# Patient Record
Sex: MALE | Race: ASIAN | NOT HISPANIC OR LATINO | Employment: FULL TIME | URBAN - METROPOLITAN AREA
[De-identification: names, ages, dates, MRNs, and addresses within clinical notes are randomized per-mention and may not be internally consistent; named-entity substitution may affect disease eponyms.]

---

## 2017-10-02 ENCOUNTER — AMBULATORY - HEALTHEAST (OUTPATIENT)
Dept: NURSING | Facility: CLINIC | Age: 37
End: 2017-10-02

## 2017-10-02 DIAGNOSIS — Z23 NEED FOR IMMUNIZATION AGAINST INFLUENZA: ICD-10-CM

## 2017-10-02 DIAGNOSIS — Z00.00 PREVENTATIVE HEALTH CARE: ICD-10-CM

## 2018-01-18 ENCOUNTER — OFFICE VISIT - HEALTHEAST (OUTPATIENT)
Dept: FAMILY MEDICINE | Facility: CLINIC | Age: 38
End: 2018-01-18

## 2018-01-18 DIAGNOSIS — R05.9 COUGH: ICD-10-CM

## 2018-01-18 DIAGNOSIS — R52 BODY ACHES: ICD-10-CM

## 2018-01-18 DIAGNOSIS — J10.1 INFLUENZA A: ICD-10-CM

## 2018-01-18 LAB
DEPRECATED S PYO AG THROAT QL EIA: NORMAL
FLUAV AG SPEC QL IA: ABNORMAL
FLUBV AG SPEC QL IA: ABNORMAL

## 2018-01-19 LAB — GROUP A STREP BY PCR: NORMAL

## 2018-08-28 ENCOUNTER — AMBULATORY - HEALTHEAST (OUTPATIENT)
Dept: NURSING | Facility: CLINIC | Age: 38
End: 2018-08-28

## 2018-08-28 DIAGNOSIS — Z23 NEED FOR INFLUENZA VACCINATION: ICD-10-CM

## 2019-09-10 ENCOUNTER — COMMUNICATION - HEALTHEAST (OUTPATIENT)
Dept: FAMILY MEDICINE | Facility: CLINIC | Age: 39
End: 2019-09-10

## 2019-09-11 ENCOUNTER — AMBULATORY - HEALTHEAST (OUTPATIENT)
Dept: NURSING | Facility: CLINIC | Age: 39
End: 2019-09-11

## 2021-05-25 ENCOUNTER — OFFICE VISIT - HEALTHEAST (OUTPATIENT)
Dept: FAMILY MEDICINE | Facility: CLINIC | Age: 41
End: 2021-05-25

## 2021-05-25 DIAGNOSIS — J06.9 VIRAL URI WITH COUGH: ICD-10-CM

## 2021-05-25 LAB
SARS-COV-2 PCR COMMENT: NORMAL
SARS-COV-2 RNA SPEC QL NAA+PROBE: NEGATIVE
SARS-COV-2 VIRUS SPECIMEN SOURCE: NORMAL

## 2021-05-26 ENCOUNTER — COMMUNICATION - HEALTHEAST (OUTPATIENT)
Dept: SCHEDULING | Facility: CLINIC | Age: 41
End: 2021-05-26

## 2021-05-31 VITALS — WEIGHT: 185 LBS

## 2021-06-01 NOTE — TELEPHONE ENCOUNTER
Can you please  move this family of two from CSS schedule to Flu Schedule?  I don't see that they will be receiving other immunizations.  Thank you!

## 2021-06-15 NOTE — PROGRESS NOTES
Chief Complaint   Patient presents with     Cough     nose running 2xdays          HPI    Patient is here for two days of a cough, mostly nonproductive, with runny nose, and body aches. No fever, sore throat, chest pain, shortness of breath. Exposed to both Influenza and strep in his wife and kids.     ROS: Pertinent ROS noted in HPI.   Allergies   Allergen Reactions     No Known Drug Allergies        Patient Active Problem List   Diagnosis     Medial meniscus tear (left)     Knee instability (left)       Family History   Problem Relation Age of Onset     Other Mother 30     Behcet's syndrome dxd 30s y/o     Other Mother      Iodine allergy (entire family avoids iodized salt)     Inguinal hernia Father      father h/o inguinal hernia surgery.     Diabetes Sister      sister DM2/obesity     Other       - 2014: No known family h/o heart disease, cancers, or unexplained deaths < 51 y/o. Mother has Bescets dxd 30s y/o.  Allergy to iodine.  Father: healthy, h/o inguinal hernia surgery. 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)     Social History Main Topics     Smoking status: Never Smoker     Smokeless tobacco: Never Used     Alcohol use Yes      Comment: rare, social     Drug use: No     Sexual activity: Yes     Partners: Female     Other Topics Concern     Not on file     Social History Narrative    Notes per PCP, Dr Jorge 2014:         HOME ENVIRONMENT:    - 2014: Lives with wife, almost 3 y/o daughter,  son; no pets, house in [facility] (sometimes has brothers/family living with  him)        MARITAL HISTORY:    - Culturally  to See Prem since 2006        EDUCATION:    - [employer] grad 2000    - [employer] BS Biology in 2005    - [employer] Masters Arts in Instruction 2007        NATIVE LANGUAGE:    - HMONG = 1st language in home (read/write)    - ENGLISH fluent        HOBBIES/LEISURE ACTIVITIES:    - 8/2014: soccer, pick-up games (used to play on [employer] teams in college)        PERSONAL HISTORY:    - POLITICAL REFUGEE. Hmong. Born in [facility], [place] (6 y/o); moved to [place], [place] 1990  (11 y/o). Lived in [place], [place] for a year. Parents still living in [place], [place] (family farm)       Objective:    Vitals:    01/18/18 0947   BP: 110/72   Pulse: 83   Resp: 14   Temp: 98  F (36.7  C)   SpO2: 98%       Gen:NAD  Oropharynx: normal throat, oral mucosa  Ears: normal TMs and canals  Nose: no discharge  Neck: NAD  CV: RRR, no M, R, G  Pulm: CTAB, normal effort    Recent Results (from the past 24 hour(s))   Rapid Strep A Screen-Throat   Result Value Ref Range    Rapid Strep A Antigen No Group A Strep detected, presumptive negative No Group A Strep detected, presumptive negative   Influenza A/B Rapid Test   Result Value Ref Range    Influenza  A, Rapid Antigen Influenza A antigen detected (!) No Influenza A antigen detected    Influenza B, Rapid Antigen No Influenza B antigen detected No Influenza B antigen detected         Influenza A  -     oseltamivir (TAMIFLU) 75 MG capsule; Take 1 capsule (75 mg total) by mouth 2 (two) times a day for 5 days.    Cough  -     Rapid Strep A Screen-Throat  -     Group A Strep, RNA Direct Detection, Throat    Body aches  -     Influenza A/B Rapid Test

## 2021-06-17 NOTE — PATIENT INSTRUCTIONS - HE
Patient was educated on the natural course of condition. COVID PCR is pending. Self isolation discussed. Conservative measures discussed including rest, increased fluids, humidifier, and over-the-counter cough suppressants. See your primary care provider if symptoms do not improve in 7 days. Seek emergency care if you develop shortness of breath or fever over 103.

## 2021-06-17 NOTE — PROGRESS NOTES
co  URGENT CARE VISIT:    SUBJECTIVE:   Jean Reed is a 40 y.o. male presenting with a chief complaint of cough and loss of smell.  Onset was 1 day(s) ago. He denies the following symptoms: fever, chills, nasal congestion and dyspnea. Course of illness is stable. Treatment measures tried include cough suppressants with no relief of symptoms.  Predisposing factors include none. Works as a . Was fully vaccinated in March.     PMH: History reviewed. No pertinent past medical history.  Allergies: No known drug allergies   Medications:   No current outpatient medications on file.     No current facility-administered medications for this visit.      Social History:   Social History     Tobacco Use     Smoking status: Never Smoker     Smokeless tobacco: Never Used   Substance Use Topics     Alcohol use: Yes     Comment: rare, social        ROS:  Review of systems negative except as stated above.    OBJECTIVE:  /90 (Patient Site: Right Arm, Patient Position: Sitting, Cuff Size: Adult Regular)   Pulse 77   Temp 98.3  F (36.8  C) (Oral)   Resp 16   Wt 187 lb 4 oz (84.9 kg)   SpO2 96%     GENERAL APPEARANCE: healthy, alert and no distress  EYES: conjunctiva clear  HENT: ear canals and TM's normal.  Mildly erythematous oropharynx. Uvula midline.   NECK: supple, nontender, no lymphadenopathy  RESP: lungs clear to auscultation - no rales, rhonchi or wheezes  CV: regular rate and rhythm, normal S1 S2, no murmur noted  SKIN: no suspicious lesions or rashes    ASSESSMENT:  1. Viral URI with cough  Symptomatic COVID-19 Virus (CORONAVIRUS) PCR       PLAN:  Patient Instructions   Patient was educated on the natural course of condition. COVID PCR is pending. Self isolation discussed. Conservative measures discussed including rest, increased fluids, humidifier, and over-the-counter cough suppressants. See your primary care provider if symptoms do not improve in 7 days. Seek emergency care if you develop  shortness of breath or fever over 103.     Patient verbalized understanding and is agreeable to plan. The patient was discharged ambulatory and in stable condition.    Rea Argueta ....................  5/25/2021   11:44 AM

## 2021-06-27 ENCOUNTER — HEALTH MAINTENANCE LETTER (OUTPATIENT)
Age: 41
End: 2021-06-27

## 2021-07-06 VITALS
RESPIRATION RATE: 16 BRPM | SYSTOLIC BLOOD PRESSURE: 128 MMHG | WEIGHT: 187.25 LBS | OXYGEN SATURATION: 96 % | HEART RATE: 77 BPM | DIASTOLIC BLOOD PRESSURE: 90 MMHG | TEMPERATURE: 98.3 F

## 2021-10-17 ENCOUNTER — HEALTH MAINTENANCE LETTER (OUTPATIENT)
Age: 41
End: 2021-10-17

## 2022-05-09 ENCOUNTER — LAB (OUTPATIENT)
Dept: FAMILY MEDICINE | Facility: CLINIC | Age: 42
End: 2022-05-09
Payer: COMMERCIAL

## 2022-05-09 DIAGNOSIS — Z20.822 SUSPECTED COVID-19 VIRUS INFECTION: ICD-10-CM

## 2022-05-09 LAB — SARS-COV-2 RNA RESP QL NAA+PROBE: POSITIVE

## 2022-05-09 PROCEDURE — U0005 INFEC AGEN DETEC AMPLI PROBE: HCPCS

## 2022-05-09 PROCEDURE — U0003 INFECTIOUS AGENT DETECTION BY NUCLEIC ACID (DNA OR RNA); SEVERE ACUTE RESPIRATORY SYNDROME CORONAVIRUS 2 (SARS-COV-2) (CORONAVIRUS DISEASE [COVID-19]), AMPLIFIED PROBE TECHNIQUE, MAKING USE OF HIGH THROUGHPUT TECHNOLOGIES AS DESCRIBED BY CMS-2020-01-R: HCPCS

## 2022-05-10 ENCOUNTER — VIRTUAL VISIT (OUTPATIENT)
Dept: FAMILY MEDICINE | Facility: CLINIC | Age: 42
End: 2022-05-10
Payer: COMMERCIAL

## 2022-05-10 ENCOUNTER — TELEPHONE (OUTPATIENT)
Dept: NURSING | Facility: CLINIC | Age: 42
End: 2022-05-10
Payer: COMMERCIAL

## 2022-05-10 DIAGNOSIS — U07.1 INFECTION DUE TO 2019 NOVEL CORONAVIRUS: Primary | ICD-10-CM

## 2022-05-10 PROCEDURE — 99213 OFFICE O/P EST LOW 20 MIN: CPT | Mod: CS | Performed by: NURSE PRACTITIONER

## 2022-05-10 ASSESSMENT — ENCOUNTER SYMPTOMS
BACK PAIN: 1
ARTHRALGIAS: 1
FATIGUE: 1
COUGH: 1
SORE THROAT: 1

## 2022-05-10 NOTE — TELEPHONE ENCOUNTER
Coronavirus (COVID-19) Notification    Caller Name (Patient, parent, daughter/son, grandparent, etc)  Pena    Reason for call  Notify of Positive Coronavirus (COVID-19) lab results, assess symptoms,  review [facility] recommendations    Lab Result    Lab test:  2019-nCoV rRt-PCR or SARS-CoV-2 PCR    Oropharyngeal AND/OR nasopharyngeal swabs is POSITIVE for 2019-nCoV RNA/SARS-COV-2 PCR (COVID-19 virus)      Gather patient reported symptoms   Assessment   Current Symptoms at time of phone call, reported by patient: (if no symptoms, document: No symptoms] Yes   Date of symptom(s) onset (if applicable) 5/7/22     If at time of call, Patients symptoms have worsened, the Patient should contact 911 or have someone drive them to Emergency Dept promptly:      If Patient calling 911, inform 911 personal that you have tested positive for the Coronavirus (COVID-19).  Place mask on and await 911 to arrive.    If Emergency Dept, If possible, please have another adult drive you to the Emergency Dept but you need to wear mask when in contact with other people.      Treatment Options:   Patient classified as COVID treatment eligible by Epic high risk algorithm: Yes  Is the patient symptomatic at the time of result notification? Yes. Was the onset of symptoms within the last 5 days? Yes.   There are now oral medications available for the treatment of COVID-19.  Taking one of these medications within the first five days of symptoms (when people may not yet feel severely ill) has been shown to make people feel better, prevent them from getting sicker, and preventing hospitalization and death.   Does the patient agree to have a visit with a provider to discuss treatment options? Yes. Is the patient seen at [facility]?  No: Warm transfer caller to [phone] to be scheduled with a virtual urgent provider.  During transfer, instruct  on appropriate time frame for visit     Review information with  Patient    Your result was positive. This means you have COVID-19 (coronavirus).    How can I protect others?    These guidelines are for isolating before returning to work, school or .    If you DO have symptoms    Stay home and away from others     For at least 5 days after your symptoms started, AND    You are fever free for 24 hours (with no medicine that reduces fever), AND    Your other symptoms are better    Wear a mask for 10 full days anytime you are around others    If you DON'T have symptoms    Stay home and away from others for at least 5 days after your positive test    Wear a mask for 10 full days anytime you are around others    There may be different guidelines for healthcare facilities.  Please check with the specific sites before arriving.    If you have been told by a doctor that you were severely ill with COVID-19 or are immunocompromised, you should isolate for at least 10 days.    You should not go back to work until you meet the guidelines above for ending your home isolation. You don't need to be retested for COVID-19 before going back to work--studies show that you won't spread the virus if it's been at least 10 days since your symptoms started (or 20 days, if you have a weak immune system).    Employers, schools, and daycares: This is an official notice for this person's medical guidelines for returning in-person.  They must meet the above guidelines before going back to work, school or  in person.    You will receive a positive COVID-19 letter via [facility] or the mail soon with additional self-care information (exception, no letters will be sent to presurgical/preprocedure patients).    Would you like me to review some of that information with you now?  No    If you were tested for an upcoming procedure, please contact your provider for next steps.    Holli Cortes

## 2022-05-10 NOTE — PATIENT INSTRUCTIONS
"  Discharge Instructions for COVID-19 Patients  You have--or may have--COVID-19. Please follow the instructions listed below.   If you have a weakened immune system, discuss with your doctor any other actions you need to take.  How can I protect others?  If you have symptoms (fever, cough, body aches or trouble breathing):    Stay home and away from others (self-isolate) until:  ? Your other symptoms have resolved (gotten better). And   ? You've had no fever--and no medicine that reduces fever--for 1 full day (24 hours). And   ? At least 10 days have passed since your symptoms started. (You may need to wait 20 days. Follow the advice of your care team.)  If you don't show symptoms, but testing showed that you have COVID-19:    Stay home and away from others (self-isolate) until at least 10 days have passed since the date of your first positive COVID-19 test.  During this time    Stay in your own room, even for meals. Use your own bathroom if you can.    Stay away from others in your home. No hugging, kissing or shaking hands. No visitors.    Don't go to work, school or anywhere else.    Clean \"high touch\" surfaces often (doorknobs, counters, handles). Use household cleaning spray or wipes.    You'll find a full list of  on the EPA website: www.epa.gov/pesticide-registration/list-n-disinfectants-use-against-sars-cov-2.    Cover your mouth and nose with a mask or other face covering to avoid spreading germs.    Wash your hands and face often. Use soap and water.    Caregivers in these groups are at risk for severe illness due to COVID-19:  ? People 65 years and older  ? People who live in a nursing home or long-term care facility  ? People with chronic disease (lung, heart, cancer, diabetes, kidney, liver, immunologic)  ? People who have a weakened immune system, including those who:    Are in cancer treatment    Take medicine that weakens the immune system, such as corticosteroids    Had a bone marrow or " organ transplant    Have an immune deficiency    Have poorly controlled HIV or AIDS    Are obese (body mass index of 40 or higher)    Smoke regularly    Caregivers should wear gloves while washing dishes, handling laundry and cleaning bedrooms and bathrooms.    Use caution when washing and drying laundry: Don't shake dirty laundry and use the warmest water setting that you can.    For more tips on managing your health at home, go to www.cdc.gov/coronavirus/2019-ncov/downloads/10Things.pdf.  How can I take care of myself at home?  1. Get lots of rest. Drink extra fluids (unless a doctor has told you not to).  2. Take Tylenol (acetaminophen) for fever or pain. If you have liver or kidney problems, ask your family doctor if it's okay to take Tylenol.   Adults can take either:   ? 650 mg (two 325 mg pills) every 4 to 6 hours, or   ? 1,000 mg (two 500 mg pills) every 8 hours as needed.  ? Note: Don't take more than 3,000 mg in one day. Acetaminophen is found in many medicines (both prescribed and over-the-counter medicines). Read all labels to be sure you don't take too much.   For children, check the Tylenol bottle for the right dose. The dose is based on the child's age or weight.  3. If you have other health problems (like cancer, heart failure, an organ transplant or severe kidney disease): Call your specialty clinic if you don't feel better in the next 2 days.  4. Know when to call 911. Emergency warning signs include:  ? Trouble breathing or shortness of breath  ? Pain or pressure in the chest that doesn't go away  ? Feeling confused like you haven't felt before, or not being able to wake up  ? Bluish-colored lips or face  5. Your doctor may have prescribed a blood thinner medicine. Follow their instructions.  Where can I get more information?     [employer] - About COVID-19:   [email or web address] [email or web address]    CDC - What to Do If You're Sick:  www.cdc.gov/coronavirus/2019-ncov/about/steps-when-sick.html    CDC - Ending Home Isolation: www.cdc.gov/coronavirus/2019-ncov/hcp/disposition-in-home-patients.html    CDC - Caring for Someone: www.cdc.gov/coronavirus/2019-ncov/if-you-are-sick/care-for-someone.html    [facility] - Interim Guidance for Hospital Discharge to Home: www.health.[facility].[place]./diseases/coronavirus/hcp/hospdischarge.pdf    Below are the COVID-19 hotlines at the [place] Department of Health ([facility]). Interpreters are available.  ? For health questions: Call 817-163-3031 or 1-510.604.7362 (7 a.m. to 7 p.m.)  ? For questions about schools and childcare: Call 899-456-8423 or 1-277.897.8107 (7 a.m. to 7 p.m.)    For informational purposes only. Not to replace the advice of your health care provider. Clinically reviewed by Dr. Maximiliano Reza.   Copyright   2020 [facility]. All rights reserved. [employer] [id number] - REV 01/05/21.

## 2022-05-10 NOTE — PROGRESS NOTES
Jean is a 41 year old who is being evaluated via a billable video visit.      How would you like to obtain your AVS? MyChart  If the video visit is dropped, the invitation should be resent by: Send to e-mail at: [email or web address]  Will anyone else be joining your video visit? No    Video Start Time: 1:05 PM    Assessment & Plan     Infection due to 2019 novel coronavirus  Normal renal function; is not on any prescription drugs, meets risk criteria of greater than 12 years old and 40kg, tested positive for COVID on May 8th 2022. Doesn't have any MASSB risk factors. We discussed there is risk in taking a new medication it is not likely going to decrease duration of symptoms. He is already not at risk for hospitalization and death. He still wanted to proceed with treatment course.   - nirmatrelvir and ritonavir (PAXLOVID) therapy pack; Take 3 tablets by mouth 2 times daily for 5 days Take 2 Nirmatrelvir tablets and 1 Ritonavir tablet twice daily for 5 days.    Return in about 1 week (around 5/17/2022) for Follow up.    JEANETTE Cash CNP  [facility] TERRI Pena is a 41 year old who presents for the following health issues:  Chief Complaint   Patient presents with     Covid Treatment     Symptoms started Saturday night, 05/07. Positive covid clinic test 05/09. Positive at home test Sunday. Would like to discuss starting antivirals.      HPI       COVID-19 Symptom Review  How many days ago did these symptoms start? Saturday 05/07     Are any of the following symptoms significant for you?     New or worsening difficulty breathing? No    Worsening cough? No    Fever or chills? No    Headache: no    Sore throat: YES-slightly    Chest pain: no    Diarrhea: YES-getting better/loose stools yesterday     Body aches? YES-bad this morning.     What treatments has patient tried? Acetaminophen and ibuprofen  Does patient live in a nursing home, group home, or shelter? no  Does patient have a  way to get food/medications during quarantined? Yes, I have a friend or family member who can help me.    Symptoms started May 7th, scratchy throat, tested on Mehran May 8th positive for COVID, body aches, cough improving, achy lower back shoulder, fatigue. No fevers, shortness of breath. Cough is feeling better today    Tylenol and ibuprofen, Nyquil.       Review of Systems   Constitutional: Positive for fatigue.   HENT: Positive for sore throat.    Respiratory: Positive for cough.    Musculoskeletal: Positive for arthralgias and back pain.          Objective           Vitals:  No vitals were obtained today due to virtual visit.    Physical Exam   GENERAL: Healthy, alert and no distress  EYES: Eyes grossly normal to inspection.  No discharge or erythema, or obvious scleral/conjunctival abnormalities.  RESP: No audible wheeze, cough, or visible cyanosis.  No visible retractions or increased work of breathing.    SKIN: Visible skin clear. No significant rash, abnormal pigmentation or lesions.  NEURO: Cranial nerves grossly intact.  Mentation and speech appropriate for age.  PSYCH: Mentation appears normal, affect normal/bright, judgement and insight intact, normal speech and appearance well-groomed.          Video-Visit Details    Type of service:  Video Visit    Video End Time:1:19 PM    Originating Location (pt. Location): Home    Distant Location (provider location):  [facility]     Platform used for Video Visit: [employer]

## 2022-05-20 ENCOUNTER — NURSE TRIAGE (OUTPATIENT)
Dept: NURSING | Facility: CLINIC | Age: 42
End: 2022-05-20
Payer: COMMERCIAL

## 2022-05-20 NOTE — TELEPHONE ENCOUNTER
Spouse See calling reporting patient was COVID 19 positive 5/9/22.  (No consent to communicate on file).    Caller is requesting general information on retesting and COVID 19 for positive patient. Denies symptoms.     Reviewed information below with caller.     You don't need to be retested for COVID-19 before going back to work--studies show that you won't spread the virus if it's been at least 10 days since your symptoms started (or 20 days, if you have a weak immune system).    Advised to have patient call back with any further questions or symptoms.    Additional Information    Negative: Nursing judgment    Negative: Nursing judgment    Negative: Nursing judgment    Negative: Nursing judgment    Information only question and nurse able to answer    Protocols used: INFORMATION ONLY CALL - NO TRIAGE-A-OH

## 2022-06-13 ENCOUNTER — LAB (OUTPATIENT)
Dept: FAMILY MEDICINE | Facility: CLINIC | Age: 42
End: 2022-06-13
Payer: COMMERCIAL

## 2022-06-13 DIAGNOSIS — Z20.822 SUSPECTED COVID-19 VIRUS INFECTION: ICD-10-CM

## 2022-06-13 LAB — SARS-COV-2 RNA RESP QL NAA+PROBE: NEGATIVE

## 2022-06-13 PROCEDURE — U0005 INFEC AGEN DETEC AMPLI PROBE: HCPCS

## 2022-06-13 PROCEDURE — U0003 INFECTIOUS AGENT DETECTION BY NUCLEIC ACID (DNA OR RNA); SEVERE ACUTE RESPIRATORY SYNDROME CORONAVIRUS 2 (SARS-COV-2) (CORONAVIRUS DISEASE [COVID-19]), AMPLIFIED PROBE TECHNIQUE, MAKING USE OF HIGH THROUGHPUT TECHNOLOGIES AS DESCRIBED BY CMS-2020-01-R: HCPCS

## 2022-07-24 ENCOUNTER — HEALTH MAINTENANCE LETTER (OUTPATIENT)
Age: 42
End: 2022-07-24

## 2022-10-02 ENCOUNTER — HEALTH MAINTENANCE LETTER (OUTPATIENT)
Age: 42
End: 2022-10-02

## 2022-10-07 ENCOUNTER — IMMUNIZATION (OUTPATIENT)
Dept: FAMILY MEDICINE | Facility: CLINIC | Age: 42
End: 2022-10-07
Payer: COMMERCIAL

## 2022-10-07 PROCEDURE — 90471 IMMUNIZATION ADMIN: CPT

## 2022-10-07 PROCEDURE — 0124A COVID-19,PF,PFIZER BOOSTER BIVALENT: CPT

## 2022-10-07 PROCEDURE — 91312 COVID-19,PF,PFIZER BOOSTER BIVALENT: CPT

## 2022-10-07 PROCEDURE — 90686 IIV4 VACC NO PRSV 0.5 ML IM: CPT

## 2023-03-29 ENCOUNTER — OFFICE VISIT (OUTPATIENT)
Dept: FAMILY MEDICINE | Facility: CLINIC | Age: 43
End: 2023-03-29
Payer: COMMERCIAL

## 2023-03-29 VITALS
BODY MASS INDEX: 32.15 KG/M2 | OXYGEN SATURATION: 99 % | HEART RATE: 68 BPM | HEIGHT: 65 IN | SYSTOLIC BLOOD PRESSURE: 128 MMHG | TEMPERATURE: 97.8 F | DIASTOLIC BLOOD PRESSURE: 98 MMHG | RESPIRATION RATE: 16 BRPM | WEIGHT: 193 LBS

## 2023-03-29 DIAGNOSIS — Z00.00 ROUTINE GENERAL MEDICAL EXAMINATION AT A HEALTH CARE FACILITY: Primary | ICD-10-CM

## 2023-03-29 DIAGNOSIS — R03.0 ELEVATED BLOOD PRESSURE READING WITHOUT DIAGNOSIS OF HYPERTENSION: ICD-10-CM

## 2023-03-29 DIAGNOSIS — R06.83 SNORING: ICD-10-CM

## 2023-03-29 DIAGNOSIS — Z13.220 LIPID SCREENING: ICD-10-CM

## 2023-03-29 DIAGNOSIS — G25.9 ABNORMAL LEG MOVEMENT: ICD-10-CM

## 2023-03-29 LAB
ALBUMIN SERPL BCG-MCNC: 4.8 G/DL (ref 3.5–5.2)
ALP SERPL-CCNC: 98 U/L (ref 40–129)
ALT SERPL W P-5'-P-CCNC: 52 U/L (ref 10–50)
ANION GAP SERPL CALCULATED.3IONS-SCNC: 13 MMOL/L (ref 7–15)
AST SERPL W P-5'-P-CCNC: 26 U/L (ref 10–50)
BILIRUB SERPL-MCNC: 0.6 MG/DL
BUN SERPL-MCNC: 16.8 MG/DL (ref 6–20)
CALCIUM SERPL-MCNC: 9.9 MG/DL (ref 8.6–10)
CHLORIDE SERPL-SCNC: 102 MMOL/L (ref 98–107)
CHOLEST SERPL-MCNC: 289 MG/DL
CREAT SERPL-MCNC: 0.8 MG/DL (ref 0.67–1.17)
DEPRECATED HCO3 PLAS-SCNC: 26 MMOL/L (ref 22–29)
ERYTHROCYTE [DISTWIDTH] IN BLOOD BY AUTOMATED COUNT: 12.4 % (ref 10–15)
GFR SERPL CREATININE-BSD FRML MDRD: >90 ML/MIN/1.73M2
GLUCOSE SERPL-MCNC: 90 MG/DL (ref 70–99)
HCT VFR BLD AUTO: 52 % (ref 40–53)
HDLC SERPL-MCNC: 43 MG/DL
HGB BLD-MCNC: 16.9 G/DL (ref 13.3–17.7)
LDLC SERPL CALC-MCNC: 200 MG/DL
MCH RBC QN AUTO: 28.1 PG (ref 26.5–33)
MCHC RBC AUTO-ENTMCNC: 32.5 G/DL (ref 31.5–36.5)
MCV RBC AUTO: 87 FL (ref 78–100)
NONHDLC SERPL-MCNC: 246 MG/DL
PLATELET # BLD AUTO: 220 10E3/UL (ref 150–450)
POTASSIUM SERPL-SCNC: 4.1 MMOL/L (ref 3.4–5.3)
PROT SERPL-MCNC: 8.3 G/DL (ref 6.4–8.3)
RBC # BLD AUTO: 6.01 10E6/UL (ref 4.4–5.9)
SODIUM SERPL-SCNC: 141 MMOL/L (ref 136–145)
TRIGL SERPL-MCNC: 231 MG/DL
WBC # BLD AUTO: 8.7 10E3/UL (ref 4–11)

## 2023-03-29 PROCEDURE — 80053 COMPREHEN METABOLIC PANEL: CPT | Performed by: FAMILY MEDICINE

## 2023-03-29 PROCEDURE — 99396 PREV VISIT EST AGE 40-64: CPT | Performed by: FAMILY MEDICINE

## 2023-03-29 PROCEDURE — 85027 COMPLETE CBC AUTOMATED: CPT | Performed by: FAMILY MEDICINE

## 2023-03-29 PROCEDURE — 36415 COLL VENOUS BLD VENIPUNCTURE: CPT | Performed by: FAMILY MEDICINE

## 2023-03-29 PROCEDURE — 80061 LIPID PANEL: CPT | Performed by: FAMILY MEDICINE

## 2023-03-29 ASSESSMENT — ENCOUNTER SYMPTOMS
CHILLS: 0
ARTHRALGIAS: 1
HEMATURIA: 0
DIZZINESS: 0
HEMATOCHEZIA: 0
NERVOUS/ANXIOUS: 0
NAUSEA: 0
JOINT SWELLING: 0
ABDOMINAL PAIN: 0
SHORTNESS OF BREATH: 0
CONSTIPATION: 0
HEARTBURN: 0
HEADACHES: 0
EYE PAIN: 0
PARESTHESIAS: 0
FEVER: 0
DIARRHEA: 0
WEAKNESS: 0
PALPITATIONS: 0
SORE THROAT: 0
FREQUENCY: 0
COUGH: 0
MYALGIAS: 1
DYSURIA: 0

## 2023-03-29 NOTE — PROGRESS NOTES
SUBJECTIVE:   CC: Jean is an 42 year old who presents for preventative health visit.   Additional Questions 3/29/2023   Roomed by cp     Patient has been advised of split billing requirements and indicates understanding: Yes  Healthy Habits:     Getting at least 3 servings of Calcium per day:  Yes    Bi-annual eye exam:  NO    Dental care twice a year:  Yes    Sleep apnea or symptoms of sleep apnea:  Excessive snoring and Sleep apnea    Diet:  Regular (no restrictions)    Frequency of exercise:  1 day/week    Duration of exercise:  15-30 minutes    Taking medications regularly:  Yes    Medication side effects:  None    PHQ-2 Total Score: 0    Additional concerns today:  Yes       at [facility]    Sister has diabetes.    Legs twitch at bedtime.  Vitamin and magnesium helps.    Snoring.  Feels rested upon arising.  No daytime somnolence.        Today's PHQ-2 Score:       3/29/2023     8:45 AM   PHQ-2 ( 1999 [employer])   Q1: Little interest or pleasure in doing things 0   Q2: Feeling down, depressed or hopeless 0   PHQ-2 Score 0   Q1: Little interest or pleasure in doing things Not at all    Not at all   Q2: Feeling down, depressed or hopeless Not at all    Not at all   PHQ-2 Score 0    0       Have you ever done Advance Care Planning? (For example, a Health Directive, POLST, or a discussion with a medical provider or your loved ones about your wishes): No, advance care planning information given to patient to review.  Patient declined advance care planning discussion at this time.    Social History     Tobacco Use     Smoking status: Never     Passive exposure: Never     Smokeless tobacco: Never   Vaping Use     Vaping status: Never Used   Substance Use Topics     Alcohol use: Yes     Comment: Alcoholic Drinks/day: rare, social             3/29/2023     8:45 AM   Alcohol Use   Prescreen: >3 drinks/day or >7 drinks/week? Not Applicable       Last PSA: No results found for: PSA    Reviewed orders with  "patient. Reviewed health maintenance and updated orders accordingly - Yes      Reviewed and updated as needed this visit by clinical staff   Tobacco  Allergies  Meds              Reviewed and updated as needed this visit by Provider                 No current outpatient medications on file.         Review of Systems   Constitutional: Negative for chills and fever.   HENT: Negative for congestion, ear pain, hearing loss and sore throat.    Eyes: Negative for pain and visual disturbance.   Respiratory: Negative for cough and shortness of breath.    Cardiovascular: Negative for chest pain, palpitations and peripheral edema.   Gastrointestinal: Negative for abdominal pain, constipation, diarrhea, heartburn, hematochezia and nausea.   Genitourinary: Negative for dysuria, frequency, genital sores, hematuria, impotence, penile discharge and urgency.   Musculoskeletal: Positive for arthralgias and myalgias. Negative for joint swelling.   Skin: Negative for rash.   Neurological: Negative for dizziness, weakness, headaches and paresthesias.   Psychiatric/Behavioral: Negative for mood changes. The patient is not nervous/anxious.          OBJECTIVE:   BP (!) 128/98   Pulse 68   Temp 97.8  F (36.6  C) (Oral)   Resp 16   Ht 1.649 m (5' 4.92\")   Wt 87.5 kg (193 lb)   SpO2 99%   BMI 32.20 kg/m      Physical Exam  Eyes: EOM full, pupils normal, conjunctivae normal  Ears: TM's and canals normal  Oropharynx: normal  Neck: supple without adenopathy or thyromegaly  Lungs: normal  Heart: regular rhythm, normal rate, no murmur  Abdomen: no HSM, mass or tenderness  : uncircumcised, penis and testes normal, no inguinal hernia or adenopathy  Rectal: prostate normal  Extremities: FROM, normal strength and sensation          ASSESSMENT/PLAN:   Jean was seen today for physical.    Diagnoses and all orders for this visit:    Routine general medical examination at a health care facility    Lipid screening  -     Lipid panel reflex to " "direct LDL Non-fasting; Future  -     Lipid panel reflex to direct LDL Non-fasting    Elevated blood pressure reading without diagnosis of hypertension  -     CBC with platelets; Future  -     Comprehensive metabolic panel (BMP + Alb, Alk Phos, ALT, AST, Total. Bili, TP); Future  -     CBC with platelets  -     Comprehensive metabolic panel (BMP + Alb, Alk Phos, ALT, AST, Total. Bili, TP)    Snoring  -     Adult Sleep Eval & Management  Referral; Future    Abnormal leg movement  -     Adult Sleep Eval & Management  Referral; Future    Other orders  -     REVIEW OF HEALTH MAINTENANCE PROTOCOL ORDERS    Walk daily.    He has BP machine.  Call if continues above 140/90.    Patient has been advised of split billing requirements and indicates understanding: Yes      COUNSELING:   Reviewed preventive health counseling, as reflected in patient instructions       Regular exercise      BMI:   Estimated body mass index is 32.2 kg/m  as calculated from the following:    Height as of this encounter: 1.649 m (5' 4.92\").    Weight as of this encounter: 87.5 kg (193 lb).         He reports that he has never smoked. He has never been exposed to tobacco smoke. He has never used smokeless tobacco.            Haim Rock MD  [facility]  "

## 2023-04-08 ASSESSMENT — ENCOUNTER SYMPTOMS
NAUSEA: 0
FREQUENCY: 0
NERVOUS/ANXIOUS: 0
MYALGIAS: 1
ABDOMINAL PAIN: 0
CHILLS: 0
WEAKNESS: 0
HEADACHES: 0
COUGH: 0
DYSURIA: 0
SORE THROAT: 0
FEVER: 0
PARESTHESIAS: 0
EYE PAIN: 0
HEARTBURN: 0
HEMATOCHEZIA: 0
JOINT SWELLING: 0
CONSTIPATION: 0
DIZZINESS: 0
ARTHRALGIAS: 1
HEMATURIA: 0
PALPITATIONS: 0
SHORTNESS OF BREATH: 0
DIARRHEA: 0

## 2024-02-28 ENCOUNTER — PATIENT OUTREACH (OUTPATIENT)
Dept: CARE COORDINATION | Facility: CLINIC | Age: 44
End: 2024-02-28
Payer: COMMERCIAL

## 2024-03-13 ENCOUNTER — PATIENT OUTREACH (OUTPATIENT)
Dept: CARE COORDINATION | Facility: CLINIC | Age: 44
End: 2024-03-13
Payer: COMMERCIAL

## 2024-05-18 ENCOUNTER — HEALTH MAINTENANCE LETTER (OUTPATIENT)
Age: 44
End: 2024-05-18

## 2024-11-23 ENCOUNTER — IMMUNIZATION (OUTPATIENT)
Dept: FAMILY MEDICINE | Facility: CLINIC | Age: 44
End: 2024-11-23
Payer: COMMERCIAL

## 2024-11-23 PROCEDURE — 90471 IMMUNIZATION ADMIN: CPT

## 2024-11-23 PROCEDURE — 90656 IIV3 VACC NO PRSV 0.5 ML IM: CPT

## 2025-06-08 ENCOUNTER — HEALTH MAINTENANCE LETTER (OUTPATIENT)
Age: 45
End: 2025-06-08

## 2025-06-26 ENCOUNTER — OFFICE VISIT (OUTPATIENT)
Dept: FAMILY MEDICINE | Facility: CLINIC | Age: 45
End: 2025-06-26
Payer: COMMERCIAL

## 2025-06-26 VITALS
HEART RATE: 74 BPM | TEMPERATURE: 98.4 F | OXYGEN SATURATION: 98 % | SYSTOLIC BLOOD PRESSURE: 120 MMHG | WEIGHT: 202.3 LBS | RESPIRATION RATE: 18 BRPM | HEIGHT: 65 IN | DIASTOLIC BLOOD PRESSURE: 84 MMHG | BODY MASS INDEX: 33.7 KG/M2

## 2025-06-26 DIAGNOSIS — Z00.00 ROUTINE GENERAL MEDICAL EXAMINATION AT A HEALTH CARE FACILITY: Primary | ICD-10-CM

## 2025-06-26 DIAGNOSIS — R74.01 ELEVATED TRANSAMINASE LEVEL: ICD-10-CM

## 2025-06-26 DIAGNOSIS — R71.8 ELEVATED RED BLOOD CELL COUNT: ICD-10-CM

## 2025-06-26 DIAGNOSIS — E78.00 HYPERCHOLESTEREMIA: ICD-10-CM

## 2025-06-26 DIAGNOSIS — Z23 ENCOUNTER FOR IMMUNIZATION: ICD-10-CM

## 2025-06-26 DIAGNOSIS — G25.81 RESTLESS LEG SYNDROME: ICD-10-CM

## 2025-06-26 LAB
ALBUMIN SERPL BCG-MCNC: 4.4 G/DL (ref 3.5–5.2)
ALP SERPL-CCNC: 84 U/L (ref 40–150)
ALT SERPL W P-5'-P-CCNC: 43 U/L (ref 0–70)
ANION GAP SERPL CALCULATED.3IONS-SCNC: 9 MMOL/L (ref 7–15)
AST SERPL W P-5'-P-CCNC: 28 U/L (ref 0–45)
BILIRUB SERPL-MCNC: 0.9 MG/DL
BUN SERPL-MCNC: 8.7 MG/DL (ref 6–20)
CALCIUM SERPL-MCNC: 9.5 MG/DL (ref 8.8–10.4)
CHLORIDE SERPL-SCNC: 103 MMOL/L (ref 98–107)
CHOLEST SERPL-MCNC: 272 MG/DL
CREAT SERPL-MCNC: 0.78 MG/DL (ref 0.67–1.17)
EGFRCR SERPLBLD CKD-EPI 2021: >90 ML/MIN/1.73M2
ERYTHROCYTE [DISTWIDTH] IN BLOOD BY AUTOMATED COUNT: 12.7 % (ref 10–15)
FASTING STATUS PATIENT QL REPORTED: YES
FASTING STATUS PATIENT QL REPORTED: YES
GLUCOSE SERPL-MCNC: 117 MG/DL (ref 70–99)
HCO3 SERPL-SCNC: 26 MMOL/L (ref 22–29)
HCT VFR BLD AUTO: 46.7 % (ref 40–53)
HDLC SERPL-MCNC: 47 MG/DL
HGB BLD-MCNC: 16.1 G/DL (ref 13.3–17.7)
LDLC SERPL CALC-MCNC: 184 MG/DL
MAGNESIUM SERPL-MCNC: 2 MG/DL (ref 1.7–2.3)
MCH RBC QN AUTO: 29.4 PG (ref 26.5–33)
MCHC RBC AUTO-ENTMCNC: 34.5 G/DL (ref 31.5–36.5)
MCV RBC AUTO: 85 FL (ref 78–100)
NONHDLC SERPL-MCNC: 225 MG/DL
PLATELET # BLD AUTO: 175 10E3/UL (ref 150–450)
POTASSIUM SERPL-SCNC: 4.3 MMOL/L (ref 3.4–5.3)
PROT SERPL-MCNC: 7.5 G/DL (ref 6.4–8.3)
RBC # BLD AUTO: 5.47 10E6/UL (ref 4.4–5.9)
SODIUM SERPL-SCNC: 138 MMOL/L (ref 135–145)
TRIGL SERPL-MCNC: 203 MG/DL
WBC # BLD AUTO: 6 10E3/UL (ref 4–11)

## 2025-06-26 SDOH — HEALTH STABILITY: PHYSICAL HEALTH: ON AVERAGE, HOW MANY DAYS PER WEEK DO YOU ENGAGE IN MODERATE TO STRENUOUS EXERCISE (LIKE A BRISK WALK)?: 3 DAYS

## 2025-06-26 ASSESSMENT — SOCIAL DETERMINANTS OF HEALTH (SDOH): HOW OFTEN DO YOU GET TOGETHER WITH FRIENDS OR RELATIVES?: ONCE A WEEK

## 2025-06-26 NOTE — PROGRESS NOTES
Preventive Care Visit  [facility]  Chris Ramos DO, Family Medicine  Jun 26, 2025      Assessment & Plan     Routine general medical examination at a health care facility  Encounter for immunization    Pleasant 44-year-old male  Lives with wife and 4 children  School  in [place] he has a hobby farm in [place]  Screenings and chronic conditions as below      Colon Cancer: Not due  Dental: Last February 2025 goes approximately twice per year, recommend going twice per year  Vision: Slowly getting worse overnight vision.  Recommend seeing an eye doctor  IPV screening: Feels safe at home.    Social determinants of health: no housing insecurity, appropriate health literacy, no food insecurities  Lipid profile: Will obtain today and recalculate ASCVD  Vaccines: Tetanus    Should be getting 5 servings of fruits and vegetables per day  Should be completing 150 minutes of brisk physical activity a week    Follow Up with PCP in 1 year or sooner for acute complaints        - REVIEW OF HEALTH MAINTENANCE PROTOCOL ORDERS  - TDAP 10-64Y (ADACEL,BOOSTRIX)    Elevated transaminase level  Elevated AST approximately 1 year ago  Will recheck  - Comprehensive metabolic panel (BMP + Alb, Alk Phos, ALT, AST, Total. Bili, TP); Future    Elevated red blood cell count  Elevated RBCs approximately 1 year ago  Will recheck  May be associating with restless leg    Restless leg syndrome  Ongoing for the l since graduating college  5-6 times per week  When he takes a multivitamin consistently for 2 to 3 weeks it goes down  This may be related to hypomagnesemia  Will check this  We will send patient a Crunchbutton message with results  - Magnesium; Future  - CBC with platelets; Future    Hypercholesteremia  Seen approximately 1 year ago  Currently fasting  Current ASCVD risk is 3.1  Will recheck and let patient know the results  - Lipid panel reflex to direct LDL Fasting; Future    The longitudinal plan of care  "for the diagnosis(es)/condition(s) as documented were addressed during this visit. Due to the added complexity in care, I will continue to support Jean in the subsequent management and with ongoing continuity of care.    BMI  Estimated body mass index is 33.18 kg/m  as calculated from the following:    Height as of this encounter: 1.663 m (5' 5.47\").    Weight as of this encounter: 91.8 kg (202 lb 4.8 oz).     Counseling  Appropriate preventive services were addressed with this patient via screening, questionnaire, or discussion as appropriate for fall prevention, nutrition, physical activity, Tobacco-use cessation, social engagement, weight loss and cognition.  Checklist reviewing preventive services available has been given to the patient.  Reviewed patient's diet, addressing concerns and/or questions.   He is at risk for lack of exercise and has been provided with information to increase physical activity for the benefit of his well-being.             Subjective   Jean is a 44 year old, presenting for the following:  Physical (Fasting. )        6/26/2025     8:44 AM   Additional Questions   Roomed by JOSE MANUEL Taylor   Accompanied by SELF          HPI  Concerns:  No concerns  As he has aged, particularly after college, thinks he has RLS  Gets a tingle in his legs  5-6 nights a week  Has tried multivitamin in the past. When consistent for 2-3 weeks, it goes down some    PMH  Patient Active Problem List    Diagnosis Date Noted    Knee instability (left)      Priority: Medium     - 4/8/15 [facility]: dx L knee instability due to   ACL deficiency, rxd PT & f/u after PT completed  History to date per PCP, Dr Jorge:   - 8/6/14: informal visit; L knee injured playing soccer, heard pop; exam =   large effusion, ?LCL tender; advised 'RICE' & ibuprofen; recheck in 2-3   wks to consider PT vs MRI vs ortho referral //sds  - 8/28/14: see dictation by Luisito; LCL injury?, referred for MRI  - 10/2014: knee " "started feeling better, so he decided no to do MRI//sds  - 2/26/15 (non-appt): recd message from patient; symptoms persist, knee   feels weak; re-ordered MRI//sds  - 4/1/15 MRI left knee: \"There is a vertical tear in the posterior horn of   the medial meniscus extending around to involve the posterior one third of   the body of the meniscus. No displaced fragments or flaps and no para   meniscal cyst.\"  - 4/3/15: referred to [facility]  - 4/8/15 Dr Juan Manuel Randall, [facility] Ortho: dx L knee instability due to   ACL deficiency, rxd PT & f/u after PT completed        Medial meniscus tear (left) 08/06/2014     Priority: Medium     History to date per PCP, Dr Jorge:   - 8/6/14: informal visit; L knee injured playing soccer, heard pop; exam =   large effusion, ?LCL tender; advised 'RICE' & ibuprofen; recheck in 2-3   wks to consider PT vs MRI vs ortho referral //sds  - 8/28/14: see dictation by Luisito; LCL injury?, referred for MRI  - 10/2014: knee started feeling better, so he decided no to do MRI//sds  - 2/26/15 (non-appt): recd message from patient; symptoms persist, knee   feels weak; re-ordered MRI//sds  - 4/1/15 MRI left knee: \"There is a vertical tear in the posterior horn of   the medial meniscus extending around to involve the posterior one third of   the body of the meniscus. No displaced fragments or flaps and no para   meniscal cyst.\"  - 4/3/15: referred to [facility]  - 4/8/15 Dr Juan Manuel Randall, [facility] Ortho: dx L knee instability due to   ACL deficiency, rxd PT & f/u after PT completed (no mention re meniscal   tear made in A/P)         Meds  No current outpatient medications on file.     PSH  Past Surgical History:   Procedure Laterality Date    NO PAST SURGERIES      as of 8/2014     Lives with: Spouse and 4 kids - range 13-5  Work:  - [employer]  Hobbies:  fixing anything with a machine  Diet: no restrictions: rice, meat, fruits   Physical Activity: going to the [employer] [place] farm near [place]  Sleep: 6    GENERAL: No fever, chills. Weight has gradually gone up  HEENT: No headache, hearing, nasal congestion, dental pain, neck pain, sore throat. Trouble seeing a long ways away - for many years now  Dentist:  February  Eye Doctor: no glasses or contacts.   CARDIAC: Denies chest pain or shortness of breath  LUNG: Denies dyspnea on exertion or chest pain  ABD: Denies pain, nausea, vomiting, diarrhea, constipation  : No changes in bladder habits  SKIN:heat rashes. eczema  NEURO: No numbness, weakness, tingling   MSK: No weakness  PSYCH: No changes in mood, no HI or SI      Advance Care Planning    Discussed advance care planning with patient; however, patient declined at this time.        6/26/2025   General Health   How would you rate your overall physical health? (!) FAIR   Feel stress (tense, anxious, or unable to sleep) Only a little   (!) STRESS CONCERN      6/26/2025   Nutrition   Three or more servings of calcium each day? Yes   Diet: Regular (no restrictions)   How many servings of fruit and vegetables per day? (!) 0-1   How many sweetened beverages each day? 0-1         6/26/2025   Exercise   Days per week of moderate/strenous exercise 3 days         6/26/2025   Social Factors   Frequency of gathering with friends or relatives Once a week   Worry food won't last until get money to buy more No   Food not last or not have enough money for food? No   Do you have housing? (Housing is defined as stable permanent housing and does not include staying outside in a car, in a tent, in an abandoned building, in an overnight shelter, or couch-surfing.) Yes   Are you worried about losing your housing? No   Lack of transportation? No   Unable to get utilities (heat,electricity)? No         6/26/2025   Dental   Dentist two times every year? Yes         Today's PHQ-2 Score:       6/26/2025     8:13 AM   PHQ-2 ( 1999 Pfizer)   Q1: Little interest or pleasure in doing things 0   Q2: Feeling down,  "depressed or hopeless 0   PHQ-2 Score 0    Q1: Little interest or pleasure in doing things Not at all   Q2: Feeling down, depressed or hopeless Not at all   PHQ-2 Score 0       Patient-reported           6/26/2025   Substance Use   Alcohol more than 3/day or more than 7/wk No   Do you use any other substances recreationally? No     Social History     Tobacco Use    Smoking status: Never     Passive exposure: Never    Smokeless tobacco: Never   Vaping Use    Vaping status: Never Used   Substance Use Topics    Alcohol use: Yes     Comment: Alcoholic Drinks/day: rare, social    Drug use: No             6/26/2025   One time HIV Screening   Previous HIV test? No         6/26/2025   STI Screening   New sexual partner(s) since last STI/HIV test? No   ASCVD Risk   The 10-year ASCVD risk score (Diane SPICER, et al., 2019) is: 3.1%    Values used to calculate the score:      Age: 44 years      Sex: Male      Is Non- : No      Diabetic: No      Tobacco smoker: No      Systolic Blood Pressure: 120 mmHg      Is BP treated: No      HDL Cholesterol: 43 mg/dL      Total Cholesterol: 289 mg/dL        6/26/2025   Contraception/Family Planning   Questions about contraception or family planning No        Reviewed and updated as needed this visit by Provider                     Objective    Exam  /84   Pulse 74   Temp 98.4  F (36.9  C) (Oral)   Resp 18   Ht 1.663 m (5' 5.47\")   Wt 91.8 kg (202 lb 4.8 oz)   SpO2 98%   BMI 33.18 kg/m     Estimated body mass index is 33.18 kg/m  as calculated from the following:    Height as of this encounter: 1.663 m (5' 5.47\").    Weight as of this encounter: 91.8 kg (202 lb 4.8 oz).    Physical Exam  Vitals reviewed.   Constitutional:       Appearance: Normal appearance.   HENT:      Head: Normocephalic and atraumatic.      Right Ear: Tympanic membrane, ear canal and external ear normal.      Left Ear: Tympanic membrane, ear canal and external ear normal.      " Nose: Nose normal.      Mouth/Throat:      Mouth: Mucous membranes are moist.   Eyes:      Extraocular Movements: Extraocular movements intact.      Pupils: Pupils are equal, round, and reactive to light.   Cardiovascular:      Rate and Rhythm: Normal rate and regular rhythm.      Heart sounds: Normal heart sounds.   Pulmonary:      Effort: Pulmonary effort is normal.      Breath sounds: Normal breath sounds.   Abdominal:      General: Abdomen is flat. Bowel sounds are normal.      Palpations: Abdomen is soft.   Musculoskeletal:      Cervical back: Normal range of motion and neck supple.      Comments: Appropriate strength in tested fields   Skin:     General: Skin is warm and dry.   Neurological:      General: No focal deficit present.      Mental Status: He is alert.   Psychiatric:         Mood and Affect: Mood normal.         Behavior: Behavior normal.       Signed Electronically by: Chris Ramos DO

## 2025-06-26 NOTE — PATIENT INSTRUCTIONS
Patient Education   Preventive Care Advice   This is general advice given by our system to help you stay healthy. However, your care team may have specific advice just for you. Please talk to your care team about your preventive care needs.  Nutrition  Eat 5 or more servings of fruits and vegetables each day.  Try wheat bread, brown rice and whole grain pasta (instead of white bread, rice, and pasta).  Get enough calcium and vitamin D. Check the label on foods and aim for 100% of the RDA (recommended daily allowance).  Lifestyle  Exercise at least 150 minutes each week  (30 minutes a day, 5 days a week).  Do muscle strengthening activities 2 days a week. These help control your weight and prevent disease.  No smoking.  Wear sunscreen to prevent skin cancer.  Have a dental exam and cleaning every 6 months.  Yearly exams  See your health care team every year to talk about:  Any changes in your health.  Any medicines your care team has prescribed.  Preventive care, family planning, and ways to prevent chronic diseases.  Shots (vaccines)   HPV shots (up to age 26), if you've never had them before.  Hepatitis B shots (up to age 59), if you've never had them before.  COVID-19 shot: Get this shot when it's due.  Flu shot: Get a flu shot every year.  Tetanus shot: Get a tetanus shot every 10 years.  Pneumococcal, hepatitis A, and RSV shots: Ask your care team if you need these based on your risk.  Shingles shot (for age 50 and up)  General health tests  Diabetes screening:  Starting at age 35, Get screened for diabetes at least every 3 years.  If you are younger than age 35, ask your care team if you should be screened for diabetes.  Cholesterol test: At age 39, start having a cholesterol test every 5 years, or more often if advised.  Bone density scan (DEXA): At age 50, ask your care team if you should have this scan for osteoporosis (brittle bones).  Hepatitis C: Get tested at least once in your life.  STIs (sexually  transmitted infections)  Before age 24: Ask your care team if you should be screened for STIs.  After age 24: Get screened for STIs if you're at risk. You are at risk for STIs (including HIV) if:  You are sexually active with more than one person.  You don't use condoms every time.  You or a partner was diagnosed with a sexually transmitted infection.  If you are at risk for HIV, ask about PrEP medicine to prevent HIV.  Get tested for HIV at least once in your life, whether you are at risk for HIV or not.  Cancer screening tests  Cervical cancer screening: If you have a cervix, begin getting regular cervical cancer screening tests starting at age 21.  Breast cancer scan (mammogram): If you've ever had breasts, begin having regular mammograms starting at age 40. This is a scan to check for breast cancer.  Colon cancer screening: It is important to start screening for colon cancer at age 45.  Have a colonoscopy test every 10 years (or more often if you're at risk) Or, ask your provider about stool tests like a FIT test every year or Cologuard test every 3 years.  To learn more about your testing options, visit:   .  For help making a decision, visit:   https://bit.ly/do27643.  Prostate cancer screening test: If you have a prostate, ask your care team if a prostate cancer screening test (PSA) at age 55 is right for you.  Lung cancer screening: If you are a current or former smoker ages 50 to 80, ask your care team if ongoing lung cancer screenings are right for you.  For informational purposes only. Not to replace the advice of your health care provider. Copyright   2023 [employer]. All rights reserved. Clinically reviewed by the [facility]. Melon Power [id number] - REV 01/24.

## 2025-06-27 ENCOUNTER — RESULTS FOLLOW-UP (OUTPATIENT)
Dept: FAMILY MEDICINE | Facility: CLINIC | Age: 45
End: 2025-06-27

## 2025-06-27 DIAGNOSIS — R73.9 HYPERGLYCEMIA: Primary | ICD-10-CM

## 2025-07-14 ENCOUNTER — MYC MEDICAL ADVICE (OUTPATIENT)
Dept: FAMILY MEDICINE | Facility: CLINIC | Age: 45
End: 2025-07-14
Payer: COMMERCIAL

## 2025-07-14 DIAGNOSIS — E11.9 TYPE 2 DIABETES MELLITUS WITHOUT COMPLICATION, WITHOUT LONG-TERM CURRENT USE OF INSULIN (H): Primary | ICD-10-CM

## 2025-07-22 ENCOUNTER — TELEPHONE (OUTPATIENT)
Dept: FAMILY MEDICINE | Facility: CLINIC | Age: 45
End: 2025-07-22
Payer: COMMERCIAL

## 2025-07-24 RX ORDER — LANCETS
EACH MISCELLANEOUS
Qty: 100 EACH | Refills: 6 | Status: SHIPPED | OUTPATIENT
Start: 2025-07-24

## 2025-07-24 RX ORDER — LANCETS
EACH MISCELLANEOUS
Qty: 100 EACH | Refills: 6 | Status: SHIPPED | OUTPATIENT
Start: 2025-07-24 | End: 2025-07-24

## 2025-07-28 ENCOUNTER — HOSPITAL ENCOUNTER (EMERGENCY)
Facility: HOSPITAL | Age: 45
Discharge: HOME | End: 2025-07-28
Attending: STUDENT IN AN ORGANIZED HEALTH CARE EDUCATION/TRAINING PROGRAM | Admitting: STUDENT IN AN ORGANIZED HEALTH CARE EDUCATION/TRAINING PROGRAM
Payer: COMMERCIAL

## 2025-07-28 ENCOUNTER — NURSE TRIAGE (OUTPATIENT)
Dept: NURSING | Facility: CLINIC | Age: 45
End: 2025-07-28
Payer: COMMERCIAL

## 2025-07-28 VITALS
SYSTOLIC BLOOD PRESSURE: 138 MMHG | RESPIRATION RATE: 20 BRPM | WEIGHT: 198.3 LBS | OXYGEN SATURATION: 96 % | TEMPERATURE: 98.7 F | HEART RATE: 110 BPM | DIASTOLIC BLOOD PRESSURE: 91 MMHG | HEIGHT: 65 IN | BODY MASS INDEX: 33.04 KG/M2

## 2025-07-28 DIAGNOSIS — S86.011A RUPTURE OF RIGHT ACHILLES TENDON, INITIAL ENCOUNTER: Primary | ICD-10-CM

## 2025-07-28 PROCEDURE — 99284 EMERGENCY DEPT VISIT MOD MDM: CPT | Performed by: STUDENT IN AN ORGANIZED HEALTH CARE EDUCATION/TRAINING PROGRAM

## 2025-07-28 ASSESSMENT — ACTIVITIES OF DAILY LIVING (ADL): ADLS_ACUITY_SCORE: 41

## 2025-07-29 ENCOUNTER — TRANSFERRED RECORDS (OUTPATIENT)
Dept: HEALTH INFORMATION MANAGEMENT | Facility: CLINIC | Age: 45
End: 2025-07-29
Payer: COMMERCIAL

## 2025-07-29 NOTE — DISCHARGE INSTRUCTIONS
Please call the orthopedic physician at [facility] tomorrow.  They should be able to get you in to see them tomorrow or wed.  Use crutches and the boot until they give you more specific instructions

## 2025-07-29 NOTE — TELEPHONE ENCOUNTER
Nurse Triage SBAR    Situation: Heel injury    Background: Patient calling. Injury happened about 5 minutes ago.     Assessment: Right achilles made a popping noise. He says he does not feel the tendon anymore. He states his pain is a 6/10 with sitting. He states he can move his foot but there is no support in the heel of his foot. He cannot bear weight on the foot. Some tightness in his leg. Initially some numbness but the numbness subsided.     Protocol Recommended Disposition: Emergency Department    Recommendation: According to the protocol, Patient should go to the ED now. Advised Patient that the patient needs to go to the ED now. Care advice given. Patient verbalizes understanding and agrees with plan of care. Reviewed concerning symptoms and when to call back.     Yaneth Morales RN Nursing Advisor 7/28/2025 7:44 PM     Reason for Disposition   Can't stand (bear weight) or walk    Additional Information   Negative: Serious injury with multiple fractures (broken bones)   Negative: [1] Major bleeding (e.g., actively dripping or spurting) AND [2] can't be stopped   Negative: Amputation   Negative: Looks like a dislocated joint (very crooked or deformed)   Negative: Sounds like a life-threatening emergency to the triager   Negative: Bullet wound, stabbed by knife, or other serious penetrating wound   Negative: Skin is split open or gaping (or length > 1/2 inch or 12 mm)   Negative: [1] Bleeding AND [2] won't stop after 10 minutes of direct pressure (using correct technique)   Negative: [1] Dirt in the wound AND [2] not removed with 15 minutes of scrubbing    Protocols used: Ankle and Foot Injury-A-

## 2025-07-29 NOTE — ED TRIAGE NOTES
Pt arrives for eval of his right achilles tendon. Pt was playing soccer and planted his right foot then suddenly felt a pop and as if someone kicked him in the ankle. Has been able to bear weight and can flex his ankle, however feels like there is a separation in his tendon.      Triage Assessment (Adult)       Row Name 07/28/25 2023          Triage Assessment    Airway WDL WDL        Respiratory WDL    Respiratory WDL WDL        Skin Circulation/Temperature WDL    Skin Circulation/Temperature WDL WDL        Cardiac WDL    Cardiac WDL WDL        Peripheral/Neurovascular WDL    Peripheral Neurovascular WDL WDL        Cognitive/Neuro/Behavioral WDL    Cognitive/Neuro/Behavioral WDL WDL

## 2025-07-29 NOTE — ED PROVIDER NOTES
EMERGENCY DEPARTMENT ENCOUNTER      NAME: eJan Reed  AGE: 45 year old male  YOB: 1980  MRN: [id number]  EVALUATION DATE & TIME: 7/28/2025 10:00 PM    PCP: Chris Ramos    ED PROVIDER: Cesar Rodríguez M.D.      Chief Complaint   Patient presents with    Sports Injury         FINAL IMPRESSION:  1. Rupture of right Achilles tendon, initial encounter          ED COURSE & MEDICAL DECISION MAKING:    Pertinent Labs & Imaging studies reviewed. (See chart for details)  45 year old male presents to the Emergency Department for evaluation of leg pain.  Suspected that this could be a Achilles injury and based on history and physical do not believe is represents any other sort of traumatic injury although x-rays and MRIs were considered do not believe it is necessary.  Spoke with orthopedics to ensure follow-up and patient will be fitted with a walking boot crutches and will call [facility] tomorrow.  No need for admission and surgery as this can be done as an outpatient although that certainly was considered.    9:58 PM I met with patient and performed initial exam.   10:15 PM I spoke with Dr. Bangura from [facility].       At the conclusion of the encounter I discussed the results of all of the tests and the disposition. The questions were answered. The patient or family acknowledged understanding and was agreeable with the care plan.              Medical Decision Making  I obtained history from Family Member/Significant Other  Discharge. No recommendations on prescription strength medication(s). See documentation for any additional details.    MIPS:  Not Applicable    SEPSIS: None                  This patient involved a high degree of complexity in medical decision making, as significant risks were present and assessed. Recent encounters & results in medical record reviewed by me.        MEDICATIONS GIVEN IN THE EMERGENCY:  Medications - No data to display    NEW PRESCRIPTIONS STARTED AT  "TODAY'S ER VISIT  Discharge Medication List as of 7/28/2025 10:23 PM             =================================================================    HPI    Patient information was obtained from: patient     Use of : N/A        Jean Reed is a 45 year old male with a pertinent history of a medial meniscus tear and knee instability who presents for evaluation of a sports injury.       At 7:30 PM, the patient was playing soccer when he felt a \"pop\" in his lower right leg after trying to plant his foot. He recalls the pain as \"paralyzing and electrifying\" and currently feels tightness and pain near his right ankle and directly proximal to it. He is concerned it may be a ruptured Achilles tendon.  He denies any other complaints at this time.     REVIEW OF SYSTEMS   Review of Systems     PAST MEDICAL HISTORY:  No past medical history on file.    PAST SURGICAL HISTORY:  Past Surgical History:   Procedure Laterality Date    NO PAST SURGERIES      as of 8/2014           CURRENT MEDICATIONS:    blood glucose (NO BRAND SPECIFIED) test strip  blood glucose monitoring (NO BRAND SPECIFIED) meter device kit  thin (NO BRAND SPECIFIED) lancets        ALLERGIES:  Allergies   Allergen Reactions    No Known Drug Allergy Unknown       FAMILY HISTORY:  Family History   Problem Relation Age of Onset    Other - See Comments Mother         Iodine allergy (entire family avoids iodized salt)    Autoimmune Disease Mother     Inguinal hernia Father         father h/o inguinal hernia surgery.    Diabetes Father     Diabetes Sister         sister DM2/obesity    Other - See Comments Other         - 8/2014: No known family h/o heart disease, cancers, or unexplained deaths < 51 y/o. Mother has Bescets dxd 30s y/o.  Allergy to iodine.  Father: healthy, h/o inguinal hernia surgery. Sibs x7 (he is #2/8, 4 brothers, 3 sisters; 1 older sister has DM2/obe    Hypertension No family hx of     Colon Cancer No family hx of        SOCIAL HISTORY: "   Social History     Socioeconomic History    Marital status: Single    Number of children: 2    Years of education: college   Tobacco Use    Smoking status: Never     Passive exposure: Never    Smokeless tobacco: Never   Vaping Use    Vaping status: Never Used   Substance and Sexual Activity    Alcohol use: Yes     Comment: Alcoholic Drinks/day: rare, social    Drug use: No    Sexual activity: Yes     Partners: Female   Social History Narrative    Notes per PCP, Dr Jorge 2014:  HOME ENVIRONMENT: - 2014: Lives with wife, almost 3 y/o daughter,  son; no pets, house [place] [facility] [place] (sometimes has brothers/family living with him) MARITAL HISTORY: - Culturally  to [place] since  EDUCATION: - [employer] grad  - [employer] BS Biology in  - [employer] [facility] [place] Masters Arts in Instruction  NATIVE LANGUAGE: - HMONG = 1st language in home (read/write) - ENGLISH fluent HOBBIES/LEISURE ACTIVITIES: - 2014: soccer, pick-up games (used to play on ong teams in college) PERSONAL HISTORY: - POLITICAL REFUGEE. Hmong. Born in [place] [facility] [place], came [place]  (8 y/o); moved to [place], [place]   (11 y/o). Lived in [place], [place] for a year. Parents still living in [place], [place] (family farm)     Social Drivers of Health     Financial Resource Strain: Low Risk  (2025)    Financial Resource Strain     Within the past 12 months, have you or your family members you live with been unable to get utilities (heat, electricity) when it was really needed?: No   Food Insecurity: Low Risk  (2025)    Food Insecurity     Within the past 12 months, did you worry that your food would run out before you got money to buy more?: No     Within the past 12 months, did the food you bought just not last and you didn t have money to get more?: No   Transportation Needs: Low Risk  (2025)    Transportation Needs     Within the past 12 months, has lack of transportation kept you from medical appointments, getting your  "medicines, non-medical meetings or appointments, work, or from getting things that you need?: No   Physical Activity: Unknown (6/26/2025)    Exercise Vital Sign     Days of Exercise per Week: 3 days   Stress: No Stress Concern Present (6/26/2025)    [employer] [place] [employer] - Occupational Stress Questionnaire     Feeling of Stress : Only a little   Social Connections: Unknown (6/26/2025)    Social Connection and Isolation Panel [NHANES]     Frequency of Social Gatherings with Friends and Family: Once a week   Interpersonal Safety: Low Risk  (6/26/2025)    Interpersonal Safety     Do you feel physically and emotionally safe where you currently live?: Yes     Within the past 12 months, have you been hit, slapped, kicked or otherwise physically hurt by someone?: No     Within the past 12 months, have you been humiliated or emotionally abused in other ways by your partner or ex-partner?: No   Housing Stability: Low Risk  (6/26/2025)    Housing Stability     Do you have housing? : Yes     Are you worried about losing your housing?: No       VITALS:  BP (!) 138/91   Pulse 110   Temp 98.7  F (37.1  C) (Oral)   Resp 20   Ht 1.651 m (5' 5\")   Wt 89.9 kg (198 lb 4.8 oz)   SpO2 96%   BMI 33.00 kg/m        PHYSICAL EXAM    Constitutional: Well developed, Well nourished, NAD, GCS 15  HENT: Normocephalic, Atraumatic, Bilateral external ears normal, Oropharynx normal, mucous membranes moist, Nose normal. Neck-  Normal range of motion, No tenderness, Supple, No stridor.  Eyes: PERRL, EOMI, Conjunctiva normal, No discharge.   Respiratory: Normal breath sounds, No respiratory distress, No wheezing, Speaks full sentences easily. No cough.  Cardiovascular: Normal heart rate, Regular rhythm, No murmurs, No rubs, No gallops. Chest wall nontender.  GI:Soft, No tenderness, No masses, No flank tenderness. No rebound or guarding.   Musculoskeletal: 2+ DP pulses. No edema.No cyanosis, No clubbing. Good range of motion " in all major joints. No tenderness to palpation or major deformities noted.   Integument: Warm, Dry, No erythema, No rash. No petechiae.   Neurologic: Alert & oriented x 3,  CN 3-12 intact Normal motor function, Normal sensory function, No focal deficits noted. Normal gait. Normal finger to nose bilaterally  Psychiatric: Affect normal, Judgment normal, Mood normal. Cooperative.          LAB:  All pertinent labs reviewed and interpreted.  Labs Ordered and Resulted from Time of ED Arrival to Time of ED Departure - No data to display    RADIOLOGY:  Reviewed all pertinent imaging. Please see official radiology report.  No orders to display       EKG:    None    PROCEDURES:   None    [facility] Documentation:   CMS Diagnoses: None               IRajat, am serving as a scribe to document services personally performed by Dr. Cesar Rodríguez based on my observation and the provider's statements to me. Cesar OBANDO MD attest that Rajat Reyez is acting in a scribe capacity, has observed my performance of the services and has documented them in accordance with my direction.    Csear Rodríguez M.D.  Emergency Medicine  [facility] EMERGENCY DEPARTMENT  [facility] [facility] [id number] [phone]  Dept: [phone]       Cesar Rodríguez MD  07/29/25 [id number]